# Patient Record
Sex: FEMALE | Race: WHITE
[De-identification: names, ages, dates, MRNs, and addresses within clinical notes are randomized per-mention and may not be internally consistent; named-entity substitution may affect disease eponyms.]

---

## 2019-02-11 ENCOUNTER — HOSPITAL ENCOUNTER (EMERGENCY)
Dept: HOSPITAL 92 - ERS | Age: 74
Discharge: HOME | End: 2019-02-11
Payer: MEDICARE

## 2019-02-11 DIAGNOSIS — J20.9: ICD-10-CM

## 2019-02-11 DIAGNOSIS — E10.9: ICD-10-CM

## 2019-02-11 DIAGNOSIS — J11.1: Primary | ICD-10-CM

## 2019-02-11 PROCEDURE — 71046 X-RAY EXAM CHEST 2 VIEWS: CPT

## 2019-02-11 PROCEDURE — 87804 INFLUENZA ASSAY W/OPTIC: CPT

## 2019-02-11 NOTE — RAD
PA AND LATERAL CHEST:

 

HISTORY:

Cough.  Congestion.  Fever.

 

COMPARISON:

07/22/2014

 

FINDINGS: 

Heart size is within normal limits.  There are atherosclerotic changes of the aorta.  The lungs are c
lear of infiltrates.  No significant bony findings.

 

IMPRESSION:

No active intrathoracic disease.

 

POS: [facility]